# Patient Record
Sex: FEMALE
[De-identification: names, ages, dates, MRNs, and addresses within clinical notes are randomized per-mention and may not be internally consistent; named-entity substitution may affect disease eponyms.]

---

## 2019-08-28 ENCOUNTER — HOSPITAL ENCOUNTER (INPATIENT)
Dept: HOSPITAL 5 - 3A | Age: 79
LOS: 4 days | Discharge: HOME | DRG: 920 | End: 2019-09-01
Attending: INTERNAL MEDICINE | Admitting: PLASTIC SURGERY
Payer: MEDICARE

## 2019-08-28 ENCOUNTER — HOSPITAL ENCOUNTER (OUTPATIENT)
Dept: HOSPITAL 5 - OR | Age: 79
Discharge: HOME | End: 2019-08-28
Attending: PLASTIC SURGERY
Payer: MEDICARE

## 2019-08-28 VITALS — DIASTOLIC BLOOD PRESSURE: 91 MMHG | SYSTOLIC BLOOD PRESSURE: 150 MMHG

## 2019-08-28 DIAGNOSIS — Z88.5: ICD-10-CM

## 2019-08-28 DIAGNOSIS — D62: ICD-10-CM

## 2019-08-28 DIAGNOSIS — Z88.8: ICD-10-CM

## 2019-08-28 DIAGNOSIS — Z98.82: ICD-10-CM
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
## 2019-08-28 DIAGNOSIS — Z98.41: ICD-10-CM

## 2019-08-28 DIAGNOSIS — Z79.899: ICD-10-CM

## 2019-08-28 DIAGNOSIS — N62: ICD-10-CM

## 2019-08-28 DIAGNOSIS — K21.9: ICD-10-CM

## 2019-08-28 DIAGNOSIS — Z82.49: ICD-10-CM

## 2019-08-28 DIAGNOSIS — F43.10: ICD-10-CM
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
## 2019-08-28 DIAGNOSIS — Z88.0: ICD-10-CM

## 2019-08-28 DIAGNOSIS — D50.8: ICD-10-CM

## 2019-08-28 DIAGNOSIS — Z88.6: ICD-10-CM

## 2019-08-28 DIAGNOSIS — L76.32: Primary | ICD-10-CM

## 2019-08-28 DIAGNOSIS — I10: ICD-10-CM

## 2019-08-28 DIAGNOSIS — G43.909: ICD-10-CM

## 2019-08-28 DIAGNOSIS — Z98.890: ICD-10-CM

## 2019-08-28 DIAGNOSIS — D56.9: ICD-10-CM

## 2019-08-28 DIAGNOSIS — Z86.2: ICD-10-CM

## 2019-08-28 DIAGNOSIS — E86.1: ICD-10-CM

## 2019-08-28 DIAGNOSIS — Z90.710: ICD-10-CM

## 2019-08-28 DIAGNOSIS — F41.9: ICD-10-CM
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
## 2019-08-28 DIAGNOSIS — Z98.42: ICD-10-CM

## 2019-08-28 DIAGNOSIS — I95.9: ICD-10-CM

## 2019-08-28 DIAGNOSIS — N64.4: Primary | ICD-10-CM

## 2019-08-28 DIAGNOSIS — J45.909: ICD-10-CM
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
## 2019-08-28 PROCEDURE — 36415 COLL VENOUS BLD VENIPUNCTURE: CPT

## 2019-08-28 PROCEDURE — 86900 BLOOD TYPING SEROLOGIC ABO: CPT

## 2019-08-28 PROCEDURE — 86922 COMPATIBILITY TEST ANTIGLOB: CPT

## 2019-08-28 PROCEDURE — 86901 BLOOD TYPING SEROLOGIC RH(D): CPT

## 2019-08-28 PROCEDURE — 86850 RBC ANTIBODY SCREEN: CPT

## 2019-08-28 PROCEDURE — 82747 ASSAY OF FOLIC ACID RBC: CPT

## 2019-08-28 PROCEDURE — G0379 DIRECT REFER HOSPITAL OBSERV: HCPCS

## 2019-08-28 PROCEDURE — 86870 RBC ANTIBODY IDENTIFICATION: CPT

## 2019-08-28 PROCEDURE — 88302 TISSUE EXAM BY PATHOLOGIST: CPT

## 2019-08-28 PROCEDURE — 82607 VITAMIN B-12: CPT

## 2019-08-28 PROCEDURE — 85007 BL SMEAR W/DIFF WBC COUNT: CPT

## 2019-08-28 PROCEDURE — 19371 PERI-IMPLT CAPSLC BRST COMPL: CPT

## 2019-08-28 PROCEDURE — 80053 COMPREHEN METABOLIC PANEL: CPT

## 2019-08-28 PROCEDURE — 85025 COMPLETE CBC W/AUTO DIFF WBC: CPT

## 2019-08-28 PROCEDURE — 93010 ELECTROCARDIOGRAM REPORT: CPT

## 2019-08-28 PROCEDURE — 93005 ELECTROCARDIOGRAM TRACING: CPT

## 2019-08-28 PROCEDURE — P9016 RBC LEUKOCYTES REDUCED: HCPCS

## 2019-08-28 PROCEDURE — 94760 N-INVAS EAR/PLS OXIMETRY 1: CPT

## 2019-08-28 PROCEDURE — A4217 STERILE WATER/SALINE, 500 ML: HCPCS

## 2019-08-28 PROCEDURE — 83550 IRON BINDING TEST: CPT

## 2019-08-28 PROCEDURE — 85027 COMPLETE CBC AUTOMATED: CPT

## 2019-08-28 PROCEDURE — 82728 ASSAY OF FERRITIN: CPT

## 2019-08-28 RX ADMIN — HYDROMORPHONE HYDROCHLORIDE PRN MG: 1 INJECTION, SOLUTION INTRAMUSCULAR; INTRAVENOUS; SUBCUTANEOUS at 10:46

## 2019-08-28 RX ADMIN — HYDROMORPHONE HYDROCHLORIDE PRN MG: 1 INJECTION, SOLUTION INTRAMUSCULAR; INTRAVENOUS; SUBCUTANEOUS at 11:31

## 2019-08-28 NOTE — OPERATIVE REPORT
Operative Report


Operative Report: 





Plastic Surgery Operative Note





Preoperative diagnosis: Bilateral mastodynia, symptomatic macromastia


Post Operative diagnosis: Same


Procedure: Bilateral explant with capsulectomy


Surgeon:  Dr. Amy Packer


Assistant: None


Anesthesia: General


Estimated blood loss: Minimal


Specimen: En bloc resection of periprosthetic capsule with implant





Indications: This patient is a 79-year-old female who presented with complaint 

of painful implants as well as excessively heavy implants since cosmetic 

augmentation performed several years ago.  Patient states that she had neck and 

back and shoulder pain and also pain in around the breast which she attributes 

to the implants.  She sought consultation for removal of the implants and does 

not want them replaced.  We discussed the benefits as well as the risks of this 

procedure including but not limited to hematoma, seroma, infection, bleeding, 

loss of volume, breast ptosis, disfigurement, scarring, and the need for further

surgery.  Patient understood and accepted these risks and desired to proceed 

with explant and capsulectomy.  Informed consent was obtained.





Procedure: After review of pertinent history and physical exam findings, the 

patient was brought into the operating room and placed supine on the OR table.  

After induction of adequate general endotracheal anesthesia, the entire chest 

was prepped and draped in the usual sterile surgical fashion.  To begin, on the 

right breast, 1% lidocaine with epinephrine was infiltrated into the skin where 

the incisions were marked.  A #15 blade was used to make the incision followed 

by electrocautery to take gaining the dissection down through breast tissue to 

the level of the periprosthetic capsule.  Of note, this was a submammary implant

procedure and pectoralis muscle was noted to be intact posterior to the implant.

 Once this was identified, a periprosthetic dissection was performed, keeping it

intact with the implant inside.  The implant was noted to be intact, without 

gross evidence of rupture.  Once the entire capsule and implant were removed en 

bloc, attention was turned to hemostasis.  This was achieved with electrocautery

after which the implant pocket was thoroughly irrigated with saline.  Noting no 

active bleeding, and 19 [place] Angel drain was placed within the submammary 

pocket and secured with 2-0 nylon suture.  We then began a 3 layer closure using

2-0 Monocryl suture followed by 3-0 Monoderm Quill in 2 layers.  The exact same 

procedure was repeated on the left side.  Of note, a much smaller implant was 

removed from the left side and patient was noted to have more native breast 

tissue on this side as well.  All incisions were then sealed with Dermabond and 

dressed with Telfa and Tegaderm.  The patient was then placed in a bra binder 

and awakened from general anesthesia.  She was then transferred to recovery room

in stable condition.  There were no complications.  All sponge needle and 

instrument counts were correct at the end of the case.

## 2019-08-28 NOTE — ANESTHESIA CONSULTATION
Anesthesia Consult and Med Hx


Date of service: 08/28/19





- Airway


Anesthetic Teeth Evaluation: Dentures (Pt has metal bars to hold dentures and 

has kept them in during prior surgeries)


ROM Head & Neck: Adequate


Mental/Hyoid Distance: Adequate


Mallampati Class: Class II


Intubation Access Assessment: Good





- Pre-Operative Health Status


ASA Pre-Surgery Classification: ASA2


Proposed Anesthetic Plan: General





- Pulmonary


Hx Smoking: Yes (Former)


Hx Asthma: Yes (As a child)


Hx Respiratory Symptoms: Yes (Seasonal allergies)





- Cardiovascular System


Hx Hypertension: Yes


Hx Heart Murmur: Yes





- Central Nervous System


Hx Back Pain: Yes (S/P back surgery)


Hx Psychiatric Problems: Yes





- Gastrointestinal


Hx Gastroesophageal Reflux Disease: Yes





- Hematic


Hx Anemia: Yes (Thalassemia minor)





- Other Systems


Hx Cancer: No

## 2019-08-29 LAB
BASOPHILS # (AUTO): 0 K/MM3 (ref 0–0.1)
BASOPHILS NFR BLD AUTO: 0.1 % (ref 0–1.8)
EOSINOPHIL # BLD AUTO: 0 K/MM3 (ref 0–0.4)
EOSINOPHIL NFR BLD AUTO: 0 % (ref 0–4.3)
HCT VFR BLD CALC: 18.2 % (ref 30.3–42.9)
HGB BLD-MCNC: 5.6 GM/DL (ref 10.1–14.3)
LYMPHOCYTES # BLD AUTO: 1 K/MM3 (ref 1.2–5.4)
LYMPHOCYTES NFR BLD AUTO: 13.4 % (ref 13.4–35)
MCHC RBC AUTO-ENTMCNC: 31 % (ref 30–34)
MCV RBC AUTO: 63 FL (ref 79–97)
MONOCYTES # (AUTO): 0.7 K/MM3 (ref 0–0.8)
MONOCYTES % (AUTO): 9.4 % (ref 0–7.3)
PLATELET # BLD: 236 K/MM3 (ref 140–440)
RBC # BLD AUTO: 2.89 M/MM3 (ref 3.65–5.03)

## 2019-08-29 PROCEDURE — 0H9T00Z DRAINAGE OF RIGHT BREAST WITH DRAINAGE DEVICE, OPEN APPROACH: ICD-10-PCS | Performed by: PLASTIC SURGERY

## 2019-08-29 PROCEDURE — 30233N1 TRANSFUSION OF NONAUTOLOGOUS RED BLOOD CELLS INTO PERIPHERAL VEIN, PERCUTANEOUS APPROACH: ICD-10-PCS | Performed by: INTERNAL MEDICINE

## 2019-08-29 RX ADMIN — METHOCARBAMOL SCH MG: 750 TABLET ORAL at 23:26

## 2019-08-29 RX ADMIN — MORPHINE SULFATE PRN MG: 4 INJECTION, SOLUTION INTRAMUSCULAR; INTRAVENOUS at 21:02

## 2019-08-29 RX ADMIN — HYDROCODONE BITARTRATE AND ACETAMINOPHEN PRN EACH: 10; 325 TABLET ORAL at 11:54

## 2019-08-29 RX ADMIN — DIPHENHYDRAMINE HYDROCHLORIDE PRN MG: 25 CAPSULE ORAL at 11:00

## 2019-08-29 RX ADMIN — METHOCARBAMOL SCH MG: 750 TABLET ORAL at 11:58

## 2019-08-29 RX ADMIN — HYDROCODONE BITARTRATE AND ACETAMINOPHEN PRN EACH: 10; 325 TABLET ORAL at 05:35

## 2019-08-29 RX ADMIN — HYDROMORPHONE HYDROCHLORIDE PRN MG: 1 INJECTION, SOLUTION INTRAMUSCULAR; INTRAVENOUS; SUBCUTANEOUS at 17:10

## 2019-08-29 RX ADMIN — TRAMADOL HYDROCHLORIDE PRN MG: 50 TABLET, COATED ORAL at 02:18

## 2019-08-29 RX ADMIN — METHOCARBAMOL SCH MG: 750 TABLET ORAL at 05:38

## 2019-08-29 RX ADMIN — HYDROCODONE BITARTRATE AND ACETAMINOPHEN PRN EACH: 10; 325 TABLET ORAL at 22:04

## 2019-08-29 RX ADMIN — METOCLOPRAMIDE PRN MG: 10 TABLET ORAL at 02:18

## 2019-08-29 RX ADMIN — DIPHENHYDRAMINE HYDROCHLORIDE PRN MG: 25 CAPSULE ORAL at 05:34

## 2019-08-29 RX ADMIN — HYDROMORPHONE HYDROCHLORIDE PRN MG: 1 INJECTION, SOLUTION INTRAMUSCULAR; INTRAVENOUS; SUBCUTANEOUS at 17:20

## 2019-08-29 RX ADMIN — TRAMADOL HYDROCHLORIDE PRN MG: 50 TABLET, COATED ORAL at 10:20

## 2019-08-29 NOTE — HISTORY AND PHYSICAL REPORT
History of Present Illness


Date of examination: 08/29/19


Date of admission: 


08/29/19 14:21








Medications and Allergies


                                    Allergies











Allergy/AdvReac Type Severity Reaction Status Date / Time


 


ammonium [From [place]ren[place]] Allergy  Respiratory Verified 08/27/19 10:56





   Arrest  


 


codeine Allergy  Shortness Verified 08/27/19 10:56





   of Breath  


 


herbal drugs [From Serenitas] Allergy  Respiratory Verified 08/27/19 10:56





   Arrest  


 


magnesium gluconate Allergy  Respiratory Verified 08/27/19 10:56





[From Serenitas]   Arrest  


 


Penicillins Allergy  Anaphylaxis Verified 08/27/19 10:56


 


potassium [From Serenitas] Allergy  Respiratory Verified 08/27/19 10:56





   Arrest  


 


pregabalin [From Lyrica] Allergy  Disoriented Verified 08/27/19 10:56


 


prochlorperazine Allergy  Shortness Verified 08/27/19 10:56





[From Compazine]   of Breath  


 


sodium [From [place]ren[place]] Allergy  Respiratory Verified 08/27/19 10:56





   Arrest  


 


tramadol Allergy  Shortness Verified 08/29/19 11:36





   of Breath  


 


valerian [From [place]renitas] Allergy  Respiratory Verified 08/27/19 10:56





   Arrest  


 


benzin Allergy  Blisters, Uncoded 08/27/19 10:56





   rash  











                                Home Medications











 Medication  Instructions  Recorded  Confirmed  Last Taken  Type


 


HYDROcodone/ACETAMINOPHEN [Vicodin 1 tab PO Q6HR PRN 08/27/19 08/28/19 08/28/19 

03:40 History





HP  mg TAB]     


 


Lisinopril [Zestril TAB] 10 - 20 mg PO DAILY 08/27/19 08/27/19 08/27/19 08:00 

History


 


Montelukast Sodium 10 mg PO HS PRN 08/27/19 08/28/19 08/27/19 18:00 History


 


Omeprazole 20 mg PO DAILY PRN 08/27/19 08/27/19 08/27/19 08:00 History


 


methOCARBAMOL [Robaxin TAB] 500 mg PO Q6H PRN 08/27/19 08/28/19 08/27/19 20:00 

History











Active Meds: 


Active Medications





Acetaminophen (Tylenol)  650 mg PO Q6H PRN


   PRN Reason: Pain MILD(1-3)/Fever >100.5/HA


   Last Admin: 08/29/19 11:00 Dose:  650 mg


   Documented by: 


Acetaminophen/Hydrocodone Bitart ([place] 10/325)  1 each PO Q6H PRN


   PRN Reason: Pain, Moderate (4-6)


   Last Admin: 08/29/19 11:54 Dose:  1 each


   Documented by: 


Diphenhydramine HCl (Benadryl)  25 mg PO Q8H PRN


   PRN Reason: Itching


   Last Admin: 08/29/19 11:00 Dose:  25 mg


   Documented by: 


Hydromorphone HCl (Dilaudid)  0.5 mg IV Q10MIN PRN


   PRN Reason: Pain , Severe (7-10)


   Stop: 08/30/19 17:06


   Last Admin: 08/29/19 17:20 Dose:  0.5 mg


   Documented by: 


Lactated Ringer's (Lactated Ringers)  1,000 mls @ 125 mls/hr IV AS DIRECT IRVING


   Last Admin: 08/29/19 02:12 Dose:  125 mls/hr


   Documented by: 


Methocarbamol (Robaxin)  750 mg PO Q6H IRVING


   Last Admin: 08/29/19 11:58 Dose:  750 mg


   Documented by: 


Methylprednisolone Sodium Succinate (Solu-Medrol)  40 mg IV Q6H PRN


   PRN Reason: Anaphylaxis


Metoclopramide HCl (Reglan)  10 mg PO Q6H PRN


   PRN Reason: Nausea And Vomiting


   Last Admin: 08/29/19 02:18 Dose:  10 mg


   Documented by: 


Ondansetron HCl (Zofran)  4 mg IV Q8H PRN


   PRN Reason: N/V unrelieved by Reglan


Sodium Chloride (Sodium Chloride Flush Syringe 10 Ml)  10 ml IV PRN PRN


   PRN Reason: LINE FLUSH


   Last Admin: 08/29/19 02:15 Dose:  10 ml


   Documented by: 











Exam





- Constitutional


Vitals: 


                                        











Temp Pulse Resp BP Pulse Ox


 


 97.8 F   88   15   158/47   100 


 


 08/29/19 17:30  08/29/19 17:30  08/29/19 17:30  08/29/19 17:30  08/29/19 17:30














Results





- Labs


CBC & Chem 7: 


                                 08/29/19 06:42





Labs: 


                             Laboratory Last Values











WBC  7.6 K/mm3 (4.5-11.0)   08/29/19  06:42    


 


RBC  2.89 M/mm3 (3.65-5.03)  L  08/29/19  06:42    


 


Hgb  5.6 gm/dl (10.1-14.3)  L*  08/29/19  06:42    


 


Hct  18.2 % (30.3-42.9)  L*  08/29/19  06:42    


 


MCV  63 fl (79-97)  L  08/29/19  06:42    


 


MCH  20 pg (28-32)  L  08/29/19  06:42    


 


MCHC  31 % (30-34)   08/29/19  06:42    


 


RDW  17.0 % (13.2-15.2)  H  08/29/19  06:42    


 


Plt Count  236 K/mm3 (140-440)   08/29/19  06:42    


 


Lymph % (Auto)  13.4 % (13.4-35.0)   08/29/19  06:42    


 


Mono % (Auto)  9.4 % (0.0-7.3)  H  08/29/19  06:42    


 


Eos % (Auto)  0.0 % (0.0-4.3)   08/29/19  06:42    


 


Baso % (Auto)  0.1 % (0.0-1.8)   08/29/19  06:42    


 


Lymph #  1.0 K/mm3 (1.2-5.4)  L  08/29/19  06:42    


 


Mono #  0.7 K/mm3 (0.0-0.8)   08/29/19  06:42    


 


Eos #  0.0 K/mm3 (0.0-0.4)   08/29/19  06:42    


 


Baso #  0.0 K/mm3 (0.0-0.1)   08/29/19  06:42    


 


Seg Neutrophils %  77.1 % (40.0-70.0)  H  08/29/19  06:42    


 


Seg Neutrophils #  5.8 K/mm3 (1.8-7.7)   08/29/19  06:42    


 


Blood Type  AB NEGATIVE   08/29/19  06:48    


 


Antibody Screen  Positive   08/29/19  06:48    


 


Antibody Identification  Anti-D (Actively Aquired)   08/29/19  06:48    


 


Crossmatch  See Detail   08/29/19  06:48

## 2019-08-29 NOTE — OPERATIVE REPORT
Operative Report


Operative Report: 


Plastic Surgery Operative Note





Preoperative Diagnosis: Right breast hematoma s/p right breast explant and caps

ulectomy


Postoperative Diagnosis:  Same


Procedure:  Evacuation of hematoma, washout and closure


Surgeon: Dr. Amy Packer


Assistant:  None


Specimen:  none


EBL: 10ml





Indications:  This patient is a 79 year old female who was admitted last night 

postoperatively for complaint of syncope and increase in her right breast drain 

output several hours after undergoing explant with capsulectomy.  Patient was 

discharged with minimal drain outputs, but reports taking her binder at home 

because she felt like it was too tight.  Shortly thereafter, the drain outputs 

began to increase dramatically and she had a syncopal episode witnessed by her 

.  She was transferred by ambulance to [facility], where she was 

noted to have acute blood loss anemia with a hemoglobin of 7.4.  Patient was 

then transferred to [facility] for further management and 

admitted to Telemetry where a type and cross was ordered.  She was noted to have

antibodies that makes her blood type rare, so the [employer] was 

contacted for PRBC's.  Upon arrival transfusion was initiated and she was taken 

urgently to the OR for evacuation of hematoma and washout.





Procedure:  After review pertinent history and physical exam findings patient 

was brought into the operating room and placed supine on the OR table.  After 

induction of adequate general endotracheal anesthesia the right chest was p

repped and draped in the usual sterile surgical fashion.  To begin, the 

Dermabond of the right breast incision was removed as well as the old drain.  

The incision was reopened using a Metzenbaum scissors to cut the existing 

Monoderm Quill sutures and an access to the submammary pocket.  Once inside, 

350-400 mL of hematoma was evacuated with suction and the pocket, which was 

noted to have scattered small bleeding fat but no discrete large source of 

bleeding, was irrigated using Pulsavac and further examined.  Electrocautery was

used for hemostasis.  Candace was then applied for chemical hemostasis and a new 

19 Fr Angel drain was placed and secured with 2-0 Nylon suture.  The wound was 

then closed in 3 layers using 2-0 Monocryl and 3-0 Monoderm Quill.  All 

incisions were then sealed with Dermabond. Patient was then awakened from 

general anesthesia and transferred to recovery in stable condition.  She 

tolerated the procedure well.  There were no complications.  All sponge, needle 

and instrument counts were correct at the end of the case.

## 2019-08-29 NOTE — PROGRESS NOTE
Subjective


Date of service: 08/29/19


Principal diagnosis: Syncope; Acute blood loss anemia


Interval history: 





Plastic Surgery Progress Note





This patient is a 79 year old female who underwent bilateral breast implant 

removal with capsulectomy on 8/28/19 in the morning.  By evening after discharge

she reports removing her bra binder, shortly after which point her right drain 

output began to increase dramatically.  She then reports "passing out", which 

prompted her  to call an ambulance.  She was transported to [place] [facility] 

where an H&H drawn came back at 7.4 and she had 75cc of bloody drainage in her 

bulb.  I spoke with the ER phsyican and coordinated a direct admit to have her 

transferred to [facility] for further management.  Overnight she reports pain, denies 

sob, cp or dizziness but is complaining of anxiety.  She is used to taking 10mg 

of Vicodin every 6 hours for pain, so she is demanding this dose in-house.  





Physical Exam:


AF; P 100's, /40's overnight.


HEENT:  NCAT; EOMI


CVS:  S1/S2, RRR


Lungs: CTAB


Breast:  Left breast exam incisions clean and dry, unremarkable, drain output 

serosanguinous; right breast incisions clean and dry, drain output bloody, 25cc;

exam suspicious for hematoma.   


Abd: Soft, nt/nd


Extr: Warm, moves all extremities








A/P  POD #1 s/p bilateral explant with capsulectomy, now with active bleeding 

and hematoma of the right breast, s/p syncopal episode, Hgb now 5.4.


For STAT blood transfusion.  She will need blood from the [place] as she has 

rare antibodies from prior transfusions


Patient revealed a history of Thalassemia today.  We will consult Heme/Onc for 

further management recommendations.


Plan for return to the OR for evacuation of hematoma, closure.  


Patient will return to step-down unit.





Objective





- Constitutional


Vitals: 


                               Vital Signs - 12hr











  08/29/19 08/29/19 08/29/19





  02:18 05:35 08:08


 


Temperature   98.8 F


 


Pulse Rate   103 H


 


Respiratory 20 20 18





Rate   


 


Blood Pressure   126/45


 


O2 Sat by Pulse   99





Oximetry   














  08/29/19 08/29/19 08/29/19





  10:48 11:15 11:30


 


Temperature  98.7 F 98.7 F


 


Pulse Rate  105 H 95 H


 


Respiratory  18 18





Rate   


 


Blood Pressure  134/46 130/51


 


O2 Sat by Pulse 100 96 95





Oximetry   














  08/29/19





  11:39


 


Temperature 


 


Pulse Rate 105 H


 


Respiratory 





Rate 


 


Blood Pressure 


 


O2 Sat by Pulse 





Oximetry 














- Labs


CBC & Chem 7: 


                                 08/29/19 06:42





Labs: 


                              Abnormal lab results











  08/29/19 08/29/19 Range/Units





  06:42 06:48 


 


RBC  2.89 L   (3.65-5.03)  M/mm3


 


Hgb  5.6 L*   (10.1-14.3)  gm/dl


 


Hct  18.2 L*   (30.3-42.9)  %


 


MCV  63 L   (79-97)  fl


 


MCH  20 L   (28-32)  pg


 


RDW  17.0 H   (13.2-15.2)  %


 


Mono % (Auto)  9.4 H   (0.0-7.3)  %


 


Lymph #  1.0 L   (1.2-5.4)  K/mm3


 


Seg Neutrophils %  77.1 H   (40.0-70.0)  %


 


Crossmatch   See Detail  














Medications & Allergies





- Medications


Allergies/Adverse Reactions: 


                                    Allergies





ammonium [From Serenitas] Allergy (Verified 08/27/19 10:56)


   Respiratory Arrest


codeine Allergy (Verified 08/27/19 10:56)


   Shortness of Breath


herbal drugs [From Serenitas] Allergy (Verified 08/27/19 10:56)


   Respiratory Arrest


magnesium gluconate [From Serenitas] Allergy (Verified 08/27/19 10:56)


   Respiratory Arrest


Penicillins Allergy (Verified 08/27/19 10:56)


   Anaphylaxis


potassium [From Serenitas] Allergy (Verified 08/27/19 10:56)


   Respiratory Arrest


pregabalin [From Lyrica] Allergy (Verified 08/27/19 10:56)


   Disoriented


prochlorperazine [From Compazine] Allergy (Verified 08/27/19 10:56)


   Shortness of Breath


sodium [From [place]itas] Allergy (Verified 08/27/19 10:56)


   Respiratory Arrest


tramadol Allergy (Verified 08/29/19 11:36)


   Shortness of Breath


valerian [From Serenitas] Allergy (Verified 08/27/19 10:56)


   Respiratory Arrest


benzin Allergy (Uncoded 08/27/19 10:56)


   Blisters, rash


   A skin adhesive 








Home Medications: 


                                Home Medications











 Medication  Instructions  Recorded  Confirmed  Last Taken  Type


 


HYDROcodone/ACETAMINOPHEN [Vicodin 1 tab PO Q6HR PRN 08/27/19 08/28/19 08/28/19 

03:40 History





HP  mg TAB]     


 


Lisinopril [Zestril TAB] 10 - 20 mg PO DAILY 08/27/19 08/27/19 08/27/19 08:00 

History


 


Montelukast Sodium 10 mg PO HS PRN 08/27/19 08/28/19 08/27/19 18:00 History


 


Omeprazole 20 mg PO DAILY PRN 08/27/19 08/27/19 08/27/19 08:00 History


 


methOCARBAMOL [Robaxin TAB] 500 mg PO Q6H PRN 08/27/19 08/28/19 08/27/19 20:00 

History











Active Medications: 














Generic Name Dose Route Start Last Admin





  Trade Name Freq  PRN Reason Stop Dose Admin


 


Acetaminophen  650 mg  08/28/19 22:19  08/29/19 11:00





  Tylenol  PO   650 mg





  Q6H PRN   Administration





  Pain MILD(1-3)/Fever >100.5/HA  


 


Acetaminophen/Hydrocodone Bitart  1 each  08/29/19 05:20  08/29/19 11:54





  [place] 10/325  PO   1 each





  Q6H PRN   Administration





  Pain, Moderate (4-6)  


 


Diphenhydramine HCl  25 mg  08/28/19 22:19  08/29/19 11:00





  Benadryl  PO   25 mg





  Q8H PRN   Administration





  Itching  


 


Lactated Ringer's  1,000 mls @ 125 mls/hr  08/28/19 23:00  08/29/19 02:12





  Lactated Ringers  IV   125 mls/hr





  AS DIRECT IRVING   Administration


 


Methocarbamol  750 mg  08/29/19 06:00  08/29/19 11:58





  Robaxin  PO   750 mg





  Q6H IRVING   Administration


 


Methylprednisolone Sodium Succinate  40 mg  08/29/19 05:23 





  Solu-Medrol  IV  





  Q6H PRN  





  Anaphylaxis  


 


Metoclopramide HCl  10 mg  08/28/19 22:19  08/29/19 02:18





  Reglan  PO   10 mg





  Q6H PRN   Administration





  Nausea And Vomiting  


 


Ondansetron HCl  4 mg  08/28/19 22:19 





  Zofran  IV  





  Q8H PRN  





  N/V unrelieved by Reglan  


 


Sodium Chloride  10 ml  08/28/19 22:19  08/29/19 02:15





  Sodium Chloride Flush Syringe 10 Ml  IV   10 ml





  PRN PRN   Administration





  LINE FLUSH

## 2019-08-30 LAB
ALBUMIN SERPL-MCNC: 3.5 G/DL (ref 3.9–5)
ALT SERPL-CCNC: 10 UNITS/L (ref 7–56)
BUN SERPL-MCNC: 9 MG/DL (ref 7–17)
BUN/CREAT SERPL: 11 %
CALCIUM SERPL-MCNC: 8.6 MG/DL (ref 8.4–10.2)
HCT VFR BLD CALC: 25.8 % (ref 30.3–42.9)
HEMOLYSIS INDEX: 1
HGB BLD-MCNC: 8.6 GM/DL (ref 10.1–14.3)
IRON SERPL-MCNC: 22 UG/DL (ref 37–170)
MCHC RBC AUTO-ENTMCNC: 33 % (ref 30–34)
MCV RBC AUTO: 73 FL (ref 79–97)
PLATELET # BLD: 218 K/MM3 (ref 140–440)
RBC # BLD AUTO: 3.53 M/MM3 (ref 3.65–5.03)
TIBC SERPL-MCNC: 225 MCG/DL (ref 250–450)

## 2019-08-30 RX ADMIN — HYDROCODONE BITARTRATE AND ACETAMINOPHEN PRN EACH: 10; 325 TABLET ORAL at 22:48

## 2019-08-30 RX ADMIN — DIPHENHYDRAMINE HYDROCHLORIDE PRN MG: 25 CAPSULE ORAL at 04:46

## 2019-08-30 RX ADMIN — METHOCARBAMOL SCH MG: 750 TABLET ORAL at 06:30

## 2019-08-30 RX ADMIN — MORPHINE SULFATE PRN MG: 4 INJECTION, SOLUTION INTRAMUSCULAR; INTRAVENOUS at 20:47

## 2019-08-30 RX ADMIN — MORPHINE SULFATE PRN MG: 4 INJECTION, SOLUTION INTRAMUSCULAR; INTRAVENOUS at 03:06

## 2019-08-30 RX ADMIN — HYDROCODONE BITARTRATE AND ACETAMINOPHEN PRN EACH: 10; 325 TABLET ORAL at 16:36

## 2019-08-30 RX ADMIN — DIPHENHYDRAMINE HYDROCHLORIDE PRN MG: 25 CAPSULE ORAL at 22:49

## 2019-08-30 RX ADMIN — HYDROCODONE BITARTRATE AND ACETAMINOPHEN PRN EACH: 10; 325 TABLET ORAL at 09:57

## 2019-08-30 RX ADMIN — MORPHINE SULFATE PRN MG: 4 INJECTION, SOLUTION INTRAMUSCULAR; INTRAVENOUS at 16:36

## 2019-08-30 RX ADMIN — METOCLOPRAMIDE PRN MG: 10 TABLET ORAL at 04:44

## 2019-08-30 RX ADMIN — METOCLOPRAMIDE PRN MG: 10 TABLET ORAL at 22:49

## 2019-08-30 RX ADMIN — HYDROCODONE BITARTRATE AND ACETAMINOPHEN PRN EACH: 10; 325 TABLET ORAL at 04:44

## 2019-08-30 RX ADMIN — METHOCARBAMOL SCH MG: 750 TABLET ORAL at 23:02

## 2019-08-30 RX ADMIN — MORPHINE SULFATE PRN MG: 4 INJECTION, SOLUTION INTRAMUSCULAR; INTRAVENOUS at 00:04

## 2019-08-30 RX ADMIN — METHOCARBAMOL SCH MG: 750 TABLET ORAL at 12:06

## 2019-08-30 RX ADMIN — DIPHENHYDRAMINE HYDROCHLORIDE PRN MG: 25 CAPSULE ORAL at 12:05

## 2019-08-30 RX ADMIN — Medication SCH MG: at 09:57

## 2019-08-30 RX ADMIN — MORPHINE SULFATE PRN MG: 4 INJECTION, SOLUTION INTRAMUSCULAR; INTRAVENOUS at 06:30

## 2019-08-30 RX ADMIN — MULTIVITAMIN SCH ML: LIQUID ORAL at 09:57

## 2019-08-30 RX ADMIN — METHOCARBAMOL SCH MG: 750 TABLET ORAL at 18:16

## 2019-08-30 RX ADMIN — MORPHINE SULFATE PRN MG: 4 INJECTION, SOLUTION INTRAMUSCULAR; INTRAVENOUS at 13:08

## 2019-08-30 RX ADMIN — METOCLOPRAMIDE PRN MG: 10 TABLET ORAL at 12:09

## 2019-08-30 NOTE — CONSULTATION
REFERRING PHYSICIAN:  Dr. Strickland.



REASON FOR CONSULTATION:  Severe anemia, antibodies present.



HISTORY OF PRESENT ILLNESS:  I saw the patient, a 79-year-old female in the

medical floor.  The patient had bilateral breast implants removal with

capsulectomy on 08/28/2019 in the morning.  The patient states her hemoglobin

was 9 before surgery; at one time, hemoglobin post-surgery was 7.  In evening

time when she removed the bra binder, there was a drain which had increased

blood and ambulance was called.  She was taken to [place] and later, she was

transferred to [facility].  At [place], she was informed that because of

her antibodies, [place] is trying to find the right blood.  At [facility], hemoglobin was found to be 5.  I have been asked to evaluate the

patient.  There is a plan by Dr. Packer to take her back to surgery.  At this

time, no headache, no visual disturbances.  No ear discharge.  Still has a drain

and blood in the drain in the chest area post-implant removal and capsulectomy. 

No vomiting, no diarrhea.  There is a mention of thalassemia as per the notes.



PAST MEDICAL HISTORY:  As above.



SOCIAL HISTORY:  Lives with .



ALLERGIES: TO AMMONIA, CODEINE, HERBAL DRUGS, AND FEW OTHERS.



PHYSICAL EXAMINATION:

VITAL SIGNS:  Temperature 98.9, pulse 88, respirations 12, /55.

HEENT:  Pallor present, no icterus.

NECK:  No neck lymph nodes.

HEART:  S1, S2, drain present in the chest.

ABDOMEN:  Soft.

NEUROLOGIC:  Alert, awake, oriented.

EXTREMITIES:  No calf tenderness.



LABORATORY DATA:  White cells 7, hemoglobin 5.6, MCV 63, platelets 236, red cell

count is 2.89.



ASSESSMENT AND PLAN:

1.  Microcytic anemia, likely secondary to bleed.  There is a mention of

thalassemia.  RBC count is low.  At this time, it is predominantly iron

deficiency.

2.  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

then, she has multiple antibodies.  I spoke to the blood bank.  They are trying

to find appropriate match.

3.  For the breast implant removal and capsulectomy-related bleeding, surgical

team is taking her to Operating Room.

4.  We will look into iron supplement, deficiency investigation and follow the

patient.





DD: 08/30/2019 05:26

DT: 08/30/2019 06:35

JOB# [id number]  [id number]

NM/NTS

## 2019-08-30 NOTE — PROGRESS NOTE
Subjective


Date of service: 08/30/19


Principal diagnosis: anemia


Interval history: 





Plastic Surgery Progress





Patient awake and alert, comfortable.  She removed her ACE wrap against my 

orders.  No further inappropriate bleeding. 








AFVSS


Breasts no evidence of hematoma seroma or infection. 


Drains appropriate serosanguinous output


Labs Hgb 8.6 post transfusion 2 units





Plan:  


Ok to d/c from plastics standpoint.


Discussed with patient the need to follow my written instructions explicitly, an

d how her failure to do so resulted in this series of events.


She understands she MUST wear her binder 24/7 and only remove to shower starting

tmw, replace immediately therafter.


Follow up as an outpatient in one week.





Objective





- Constitutional


Vitals: 


                               Vital Signs - 12hr











  08/30/19 08/30/19 08/30/19





  00:04 02:00 03:00


 


Temperature 97.6 F  


 


Pulse Rate   87


 


Respiratory 16 15 





Rate   


 


Respiratory  17 





Rate [Bilateral   





Chest]   


 


O2 Sat by Pulse  98 





Oximetry   














  08/30/19 08/30/19 08/30/19





  03:06 03:57 04:44


 


Temperature  98.8 F 


 


Pulse Rate   


 


Respiratory 19  11 L





Rate   


 


Respiratory   





Rate [Bilateral   





Chest]   


 


O2 Sat by Pulse   





Oximetry   














- Labs


CBC & Chem 7: 


                                 08/30/19 08:21





                                 08/30/19 08:21


Labs: 


                              Abnormal lab results











  08/29/19 08/30/19 08/30/19 Range/Units





  06:48 08:21 08:21 


 


RBC    3.53 L  (3.65-5.03)  M/mm3


 


Hgb    8.6 L D  (10.1-14.3)  gm/dl


 


Hct    25.8 L D  (30.3-42.9)  %


 


MCV    73 L  (79-97)  fl


 


MCH    24 L  (28-32)  pg


 


RDW    27.9 H  (13.2-15.2)  %


 


Iron     ()  ug/dL


 


TIBC     (250-450)  mcg/dL


 


Total Protein   5.9 L   (6.3-8.2)  g/dL


 


Albumin   3.5 L   (3.9-5)  g/dL


 


Crossmatch  See Detail    














  08/30/19 Range/Units





  08:21 


 


RBC   (3.65-5.03)  M/mm3


 


Hgb   (10.1-14.3)  gm/dl


 


Hct   (30.3-42.9)  %


 


MCV   (79-97)  fl


 


MCH   (28-32)  pg


 


RDW   (13.2-15.2)  %


 


Iron  22 L  ()  ug/dL


 


TIBC  225 L  (250-450)  mcg/dL


 


Total Protein   (6.3-8.2)  g/dL


 


Albumin   (3.9-5)  g/dL


 


Crossmatch   














Medications & Allergies





- Medications


Allergies/Adverse Reactions: 


                                    Allergies





ammonium [From Serenitas] Allergy (Verified 08/27/19 10:56)


   Respiratory Arrest


codeine Allergy (Verified 08/27/19 10:56)


   Shortness of Breath


herbal drugs [From [place]] Allergy (Verified 08/27/19 10:56)


   Respiratory Arrest


magnesium gluconate [From [place]] Allergy (Verified 08/27/19 10:56)


   Respiratory Arrest


Penicillins Allergy (Verified 08/27/19 10:56)


   Anaphylaxis


potassium [From [place]] Allergy (Verified 08/27/19 10:56)


   Respiratory Arrest


pregabalin [From Lyrica] Allergy (Verified 08/27/19 10:56)


   Disoriented


prochlorperazine [From Compazine] Allergy (Verified 08/27/19 10:56)


   Shortness of Breath


sodium [From [place]] Allergy (Verified 08/27/19 10:56)


   Respiratory Arrest


tramadol Allergy (Verified 08/29/19 11:36)


   Shortness of Breath


valerian [From [place]] Allergy (Verified 08/27/19 10:56)


   Respiratory Arrest


benzin Allergy (Uncoded 08/27/19 10:56)


   Blisters, rash


   A skin adhesive 








Home Medications: 


                                Home Medications











 Medication  Instructions  Recorded  Confirmed  Last Taken  Type


 


HYDROcodone/ACETAMINOPHEN [Vicodin 1 tab PO Q6HR PRN 08/27/19 08/28/19 08/28/19 

03:40 History





HP  mg TAB]     


 


Lisinopril [Zestril TAB] 10 - 20 mg PO DAILY 08/27/19 08/27/19 08/27/19 08:00 

History


 


Montelukast Sodium 10 mg PO HS PRN 08/27/19 08/28/19 08/27/19 18:00 History


 


Omeprazole 20 mg PO DAILY PRN 08/27/19 08/27/19 08/27/19 08:00 History


 


methOCARBAMOL [Robaxin TAB] 500 mg PO Q6H PRN 08/27/19 08/28/19 08/27/19 20:00 

History











Active Medications: 














Generic Name Dose Route Start Last Admin





  Trade Name Freq  PRN Reason Stop Dose Admin


 


Acetaminophen  650 mg  08/28/19 22:19  08/29/19 11:00





  Tylenol  PO   650 mg





  Q6H PRN   Administration





  Pain MILD(1-3)/Fever >100.5/HA  


 


Acetaminophen/Hydrocodone Bitart  1 each  08/29/19 05:20  08/30/19 09:57





  [place] 10/325  PO   1 each





  Q6H PRN   Administration





  Pain, Moderate (4-6)  


 


Diphenhydramine HCl  25 mg  08/28/19 22:19  08/30/19 04:46





  Benadryl  PO   25 mg





  Q8H PRN   Administration





  Itching  


 


Ferrous Gluconate  324 mg  08/30/19 10:00  08/30/19 09:57





  Fergon  PO   324 mg





  QDAY IRVING   Administration


 


Hydromorphone HCl  0.5 mg  08/29/19 17:07  08/29/19 17:20





  Dilaudid  IV  08/30/19 17:06  0.5 mg





  Q10MIN PRN   Administration





  Pain , Severe (7-10)  


 


Lactated Ringer's  1,000 mls @ 125 mls/hr  08/28/19 23:00  08/29/19 02:12





  Lactated Ringers  IV   125 mls/hr





  AS DIRECT IRVING   Administration


 


Methocarbamol  750 mg  08/29/19 06:00  08/30/19 06:30





  Robaxin  PO   750 mg





  Q6H IRVING   Administration


 


Methylprednisolone Sodium Succinate  40 mg  08/29/19 05:23 





  Solu-Medrol  IV  





  Q6H PRN  





  Anaphylaxis  


 


Metoclopramide HCl  10 mg  08/28/19 22:19  08/30/19 04:44





  Reglan  PO   10 mg





  Q6H PRN   Administration





  Nausea And Vomiting  


 


Morphine Sulfate  4 mg  08/29/19 20:39  08/30/19 06:30





  Morphine  IV   4 mg





  Q3H PRN   Administration





  Pain , Severe (7-10)  


 


Multivitamins  5 ml  08/30/19 10:00  08/30/19 09:57





  Centrum Liq  PO   5 ml





  QDAY IRVING   Administration


 


Ondansetron HCl  4 mg  08/28/19 22:19 





  Zofran  IV  





  Q8H PRN  





  N/V unrelieved by Reglan  


 


Sodium Chloride  10 ml  08/28/19 22:19  08/29/19 02:15





  Sodium Chloride Flush Syringe 10 Ml  IV   10 ml





  PRN PRN   Administration





  LINE FLUSH

## 2019-08-30 NOTE — HEM/ONC PROGRESS NOTE
Assessment and Plan





1.  Microcytic anemia, likely secondary to bleed.  There is a mention of 

thalassemia.  RBC count is low.  At this time, it is predominantly iron 

deficiency.


2.  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

then, she has multiple antibodies.  I spoke to the blood bank.  They are trying 

to find appropriate match.


3.  For the breast implant removal and capsulectomy-related bleeding, surgical 

team is taking her to Operating Room.


4.  We will look into iron supplement, deficiency investigation and follow the 

patient.








iv iron trial


pt says alpha thal trait


pt denies allergy to sodium


IV iron ferrlecit


oral MVI





- Patient Problems


(1) Anemia


Status: Acute   


Qualifiers: 


   Anemia type: iron deficiency   Iron deficiency anemia type: other iron 

deficiency   Qualified Code(s): D50.8 - Other iron deficiency anemias   





Subjective


Date of service: 08/30/19


Principal diagnosis: anemia


Interval history: 





s/p sx





Objective





- Exam


Narrative Exam: 








Pain  - chest - post sx


General appearance  no acute distress


Performance status  limited self care


Eyes - no icterus


ENT  - no bleeding





LNs  cervical  not palpable


Neck  - no LN


Respiratory  Normal


Breath sounds - CTA





CVS  S1 S2 +


Extremities  no edema


General GI  Soft


Rectal  deferred


female  - deferred


Skin  warm 


Musculoskeletal  moving extremitites 


Neurologically  awake - answers questions





- Constitutional


Vitals: 


                                Last Vital Signs











Temp  98.8 F   08/30/19 03:57


 


Pulse  88   08/29/19 18:55


 


Resp  11 L  08/30/19 04:44


 


BP  145/55   08/29/19 18:55


 


Pulse Ox  100   08/29/19 20:38














- Labs


Lab Results: 


                         Laboratory Results - last 24 hr











  08/29/19 08/29/19





  06:42 06:48


 


WBC  7.6 


 


RBC  2.89 L 


 


Hgb  5.6 L* 


 


Hct  18.2 L* 


 


MCV  63 L 


 


MCH  20 L 


 


MCHC  31 


 


RDW  17.0 H 


 


Plt Count  236 


 


Lymph % (Auto)  13.4 


 


Baso % (Auto)  0.1 


 


Lymph #  1.0 L 


 


Blood Type   AB NEGATIVE


 


Antibody Screen   Positive


 


Antibody Identification   Anti-D (Actively Aquired)


 


Crossmatch   See Detail














Medications & Allergies





- Medications


Allergies/Adverse Reactions: 


                                    Allergies





ammonium [From Serenitas] Allergy (Verified 08/27/19 10:56)


   Respiratory Arrest


codeine Allergy (Verified 08/27/19 10:56)


   Shortness of Breath


herbal drugs [From Serenitas] Allergy (Verified 08/27/19 10:56)


   Respiratory Arrest


magnesium gluconate [From Serenitas] Allergy (Verified 08/27/19 10:56)


   Respiratory Arrest


Penicillins Allergy (Verified 08/27/19 10:56)


   Anaphylaxis


potassium [From Serenitas] Allergy (Verified 08/27/19 10:56)


   Respiratory Arrest


pregabalin [From Lyrica] Allergy (Verified 08/27/19 10:56)


   Disoriented


prochlorperazine [From Compazine] Allergy (Verified 08/27/19 10:56)


   Shortness of Breath


sodium [From Serenitas] Allergy (Verified 08/27/19 10:56)


   Respiratory Arrest


tramadol Allergy (Verified 08/29/19 11:36)


   Shortness of Breath


valerian [From Serenitas] Allergy (Verified 08/27/19 10:56)


   Respiratory Arrest


benzin Allergy (Uncoded 08/27/19 10:56)


   Blisters, rash


   A skin adhesive 








Home Medications: 


                                Home Medications











 Medication  Instructions  Recorded  Confirmed  Last Taken  Type


 


HYDROcodone/ACETAMINOPHEN [Vicodin 1 tab PO Q6HR PRN 08/27/19 08/31/19 08/28/19 

03:40 History





HP  mg TAB]     


 


Montelukast Sodium 10 mg PO HS PRN 08/27/19 08/31/19 08/27/19 18:00 History


 


Omeprazole 20 mg PO NOW PRN 08/27/19 08/31/19 08/27/19 08:00 History


 


methOCARBAMOL [Robaxin TAB] 500 mg PO Q6H PRN 08/27/19 08/31/19 08/27/19 20:00 

History


 


Ferrous Gluconate [Fergon 325  mg PO QDAY #30 tablet 09/01/19  Unknown Rx





tab]     


 


Lisinopril [Zestril TAB] 40 mg PO QDAY #30 tablet 09/01/19  Unknown Rx


 


hydrOXYzine PAMOATE [Vistaril] 50 mg PO TID 30 Days  capsule 09/01/19  Unknown 

Rx


 


predniSONE [Deltasone] 20 mg PO QDAY #7 tab 09/01/19  Unknown Rx











Active Medications: 














Generic Name Dose Route Start Last Admin





  Trade Name Freq  PRN Reason Stop Dose Admin


 


Acetaminophen  650 mg  08/28/19 22:19  08/29/19 11:00





  Tylenol  PO   650 mg





  Q6H PRN   Administration





  Pain MILD(1-3)/Fever >100.5/HA  


 


Acetaminophen/Hydrocodone Bitart  1 each  08/29/19 05:20  08/30/19 04:44





  [place] 10/325  PO   1 each





  Q6H PRN   Administration





  Pain, Moderate (4-6)  


 


Diphenhydramine HCl  25 mg  08/28/19 22:19  08/30/19 04:46





  Benadryl  PO   25 mg





  Q8H PRN   Administration





  Itching  


 


Ferrous Gluconate  324 mg  08/30/19 10:00 





  Fergon  PO  





  QDAY IRVING  


 


Hydromorphone HCl  0.5 mg  08/29/19 17:07  08/29/19 17:20





  Dilaudid  IV  08/30/19 17:06  0.5 mg





  Q10MIN PRN   Administration





  Pain , Severe (7-10)  


 


Lactated Ringer's  1,000 mls @ 125 mls/hr  08/28/19 23:00  08/29/19 02:12





  Lactated Ringers  IV   125 mls/hr





  AS DIRECT IRVING   Administration


 


Methocarbamol  750 mg  08/29/19 06:00  08/30/19 06:30





  Robaxin  PO   750 mg





  Q6H IRVING   Administration


 


Methylprednisolone Sodium Succinate  40 mg  08/29/19 05:23 





  Solu-Medrol  IV  





  Q6H PRN  





  Anaphylaxis  


 


Metoclopramide HCl  10 mg  08/28/19 22:19  08/30/19 04:44





  Reglan  PO   10 mg





  Q6H PRN   Administration





  Nausea And Vomiting  


 


Morphine Sulfate  4 mg  08/29/19 20:39  08/30/19 03:06





  Morphine  IV   4 mg





  Q3H PRN   Administration





  Pain , Severe (7-10)  


 


Multivitamins  5 ml  08/30/19 10:00 





  Centrum Liq  PO  





  QDAY IRVING  


 


Ondansetron HCl  4 mg  08/28/19 22:19 





  Zofran  IV  





  Q8H PRN  





  N/V unrelieved by Reglan  


 


Sodium Chloride  10 ml  08/28/19 22:19  08/29/19 02:15





  Sodium Chloride Flush Syringe 10 Ml  IV   10 ml





  PRN PRN   Administration





  LINE FLUSH

## 2019-08-30 NOTE — EVENT NOTE
Date: 08/29/19


See H/p in reports


Acute blood loss anemia


S/p Breast implant removal


Syncope yesterday sec to Hypovolemia and blood loss.


HTN

## 2019-08-30 NOTE — HISTORY AND PHYSICAL REPORT
CHIEF COMPLAINT:

1.  Syncope.

2.  Severe blood loss anemia.



HISTORY OF PRESENT ILLNESS:  A 79-year-old female was admitted last night

perioperatively for syncope and increased drainage from her right breast drain. 

The patient had a bilateral implant removal.  Apparently, the patient took her

binder at home out, because it was too tight.  After taking the binder out, the

drain outputs began to increase dramatically and she had a syncopal episode

witnessed by her .  The patient called the EMS and the ambulance took her

to [facility] where she was noted to have acute blood loss anemia with a

hemoglobin of 7.4.  The patient was transferred to [facility] for further management and was admitted to telemetry where a type and

cross was ordered.  She was noted to have antibodies that made her blood type

rare.  The [facility] was contacted to give 1 unit of PRBCs.  Upon

arrival, transfusion was initiated and she was taken urgently to the OR for

evacuation of hematoma and washout.  The patient was taken to the OR and

hematoma of 300-400 mL from the right breast was evacuated.  Post-evacuation,

the patient was in pain, but was doing relatively well.  The patient is being

admitted to [facility] for further observation.



PAST MEDICAL HISTORY:  Significant for hypertension, on lisinopril 10 mg daily.



PAST SURGICAL HISTORY:  Breast implants many years ago.  Breast implant removal

on 08/28/2019 and hematoma removal from the right breast on 08/29/2019.



SOCIAL HISTORY:  Does not smoke.  No alcohol, no recreational drugs.



FAMILY HISTORY:  Hypertension.



REVIEW OF SYSTEMS:  Significant for severe pain in the surgical site on the

right breast and the left breast, more so in the right breast region. 

Otherwise, review of systems negative.



PHYSICAL EXAMINATION:

GENERAL:  Elderly female, cooperative during examination, in pain.

VITAL SIGNS:  Temperature 97.6, pulse is 100, blood pressure is 155/78.  Repeat

pulse rate was 88.

HEENT:  Unremarkable.  Pupils equal and reactive.

NECK:  Supple, no lymphadenopathy, no thyromegaly.

LUNGS:  Clear to auscultation and percussion.  Good air entry.

BREASTS:  Both the breasts in binders and drains present.

CARDIOVASCULAR:  S1, S2 heard.  No gallop, no murmur, no rub.  Apical impulse in

left fifth intercostal space in midclavicular line.

ABDOMEN:  Soft and benign.  No hepatosplenomegaly, no guarding, no rigidity. 

Hernial orifices are normal.

EXTREMITIES:  Good pedal pulses.  No pedal edema.

CENTRAL NERVOUS SYSTEM:  Alert and oriented x 4, nonfocal exam.



LABORATORY DATA:  Significant for hemoglobin of 5.6 and hematocrit of 18.2,

platelet count of 236,000.



ASSESSMENT AND PLAN:

1.  Acute anemia secondary to blood loss.  The patient to be transfused 1-2

units of packed red blood cells.  The patient has lot of antibodies.  It is

difficult to get the blood [facility] to

provide the blood 1-2 units.

2.  Right breast hematoma evacuated.  Breast surgery consult placed with Dr. Packer.

3.  Hypertension.  We will initiate blood pressure medicines when the blood

pressure goes up.  Presently, the patient's blood pressure is in the normal

range.  The patient had syncope yesterday because of hypotension.

4.  Syncope secondary to blood loss anemia and hypotension.  No syncope workup

because the etiology is very clear secondary to blood loss anemia and

hypovolemia.

5.  Deep venous thrombosis prophylaxis, sequential compression devices only.

6.  Pain management.  Morphine 4 mg q. 3 p.r.n.  The patient already on very low

dose morphine pump.

7.  Also, gastrointestinal prophylaxis.



CRITICAL CARE STATEMENT:  The high probability of a clinically significant,

sudden or life-threatening deterioration of the hematologic, pulmonary, cardiac

and renal systems required my full and direct attention, intervention, and

personal management.  The aggregate critical care time was 35 minutes.  This

time is in addition to time spent performing reported procedures, but includes

the following, data review and interpretation, patient assessment and monitoring

of vital signs, documentation and medication orders and management.



DISCHARGE PLANNING:  Whenever the patient is hemodynamically stable and the

blood counts are stable, the patient can be discharged.





DD: 08/30/2019 06:05

DT: 08/30/2019 06:43

JOB# [id number]  [id number]

CEHSTER/BASILIO

## 2019-08-31 RX ADMIN — METHOCARBAMOL SCH MG: 750 TABLET ORAL at 18:30

## 2019-08-31 RX ADMIN — MORPHINE SULFATE PRN MG: 4 INJECTION, SOLUTION INTRAMUSCULAR; INTRAVENOUS at 08:26

## 2019-08-31 RX ADMIN — METOCLOPRAMIDE PRN MG: 10 TABLET ORAL at 08:36

## 2019-08-31 RX ADMIN — MORPHINE SULFATE PRN MG: 4 INJECTION, SOLUTION INTRAMUSCULAR; INTRAVENOUS at 03:59

## 2019-08-31 RX ADMIN — HYDROCODONE BITARTRATE AND ACETAMINOPHEN PRN EACH: 10; 325 TABLET ORAL at 16:24

## 2019-08-31 RX ADMIN — Medication SCH MG: at 09:39

## 2019-08-31 RX ADMIN — MORPHINE SULFATE PRN MG: 4 INJECTION, SOLUTION INTRAMUSCULAR; INTRAVENOUS at 22:31

## 2019-08-31 RX ADMIN — HYDROCODONE BITARTRATE AND ACETAMINOPHEN PRN EACH: 10; 325 TABLET ORAL at 06:46

## 2019-08-31 RX ADMIN — METHOCARBAMOL SCH MG: 750 TABLET ORAL at 11:38

## 2019-08-31 RX ADMIN — MULTIVITAMIN SCH ML: LIQUID ORAL at 09:39

## 2019-08-31 RX ADMIN — HYDROXYZINE PAMOATE SCH MG: 25 CAPSULE ORAL at 19:49

## 2019-08-31 RX ADMIN — MORPHINE SULFATE PRN MG: 4 INJECTION, SOLUTION INTRAMUSCULAR; INTRAVENOUS at 14:52

## 2019-08-31 RX ADMIN — METHOCARBAMOL SCH MG: 750 TABLET ORAL at 06:00
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
## 2019-08-31 NOTE — PROGRESS NOTE
Assessment and Plan








80 y/o lady who had zainab breast implant. Removed the binders because she felt 

they were too tight. Had hematoma in the right breats resulting in syncope and 

and collapse. Was taken emergently to OR where the hematoma was evacuated. Pt 

had blood transfusion








- Syncope and collapse


from anemia





-Anemia s/p blood transfusion


secondary to hematoma from breast implant following removal of dressing





- S/p zainab breast implant with evacuation of hematoma from the right breast on 

2/29/19





- HTN


Optimize BP control 





- PTSD


On visteril





- DVT PPX with scd only b/c Anemia and hematoma


 


- Disposition: Optimize BP control from the 170s and D/c home











Subjective


Date of service: 08/30/19


Principal diagnosis: anemia


Interval history: 





no new complaint





Objective





- Exam


Narrative Exam: 





Constitutional: Well-nourished well-developed.  In no distress


Head: Normocephalic atraumatic


Eyes: Pupils are equal round and reactive to light


Nose: No enlarged turbinates, no septal deviation.


Mouth: Moist mucous membranes.


Neck: Supple no thyromegaly.  No bruit.  No JVD


Heart: Regular rate and rhythm, S1-S2 normal.  No rubs murmurs or gallop


Lungs: Clear to auscultation bilaterally. no rales or rhonchi


Abdomen: Soft, nontender. Bowel sound are present. 


Extremities: No edema, no cyanosis, no clubbing.


Neuro: Alert oriented Oriented x3. No focal sensory or motor deficit.


Skin: No rashes or hyperpigmented spots


Musculoskeletal system: No joint pain or swelling


Hematological: No petechia or subcutanous hemorrhages.


Immunological: No multiple septic spots on the skin


Lymphatic: No generalized lymphadenopathy


Psychiatry: Euthymic. Calm.














- Constitutional


Vitals: 


                               Vital Signs - 12hr











  08/31/19 08/31/19 08/31/19





  10:51 11:00 11:11


 


Temperature   


 


Pulse Rate 80 77 98 H


 


Pulse Rate [   





From Monitor]   


 


Respiratory 14 15 18





Rate   


 


Respiratory   





Rate [Bilateral   





Chest]   


 


Blood Pressure 167/59 138/59 138/59


 


O2 Sat by Pulse 92 95 87





Oximetry   














  08/31/19 08/31/19 08/31/19





  11:21 11:31 11:41


 


Temperature   


 


Pulse Rate 100 H 97 H 110 H


 


Pulse Rate [   





From Monitor]   


 


Respiratory 13 8 L 17





Rate   


 


Respiratory   





Rate [Bilateral   





Chest]   


 


Blood Pressure 138/59 138/59 138/59


 


O2 Sat by Pulse 100 100 97





Oximetry   














  08/31/19 08/31/19 08/31/19





  11:51 12:00 12:01


 


Temperature  98.3 F 


 


Pulse Rate 95 H  110 H


 


Pulse Rate [   





From Monitor]   


 


Respiratory 13 15 21





Rate   


 


Respiratory   





Rate [Bilateral   





Chest]   


 


Blood Pressure 138/59  147/90


 


O2 Sat by Pulse 100 97 98





Oximetry   














  08/31/19 08/31/19 08/31/19





  12:10 12:21 12:31


 


Temperature   


 


Pulse Rate 102 H 104 H 102 H


 


Pulse Rate [   





From Monitor]   


 


Respiratory 16 17 12





Rate   


 


Respiratory   





Rate [Bilateral   





Chest]   


 


Blood Pressure 147/90 147/90 147/90


 


O2 Sat by Pulse 84  





Oximetry   














  08/31/19 08/31/19 08/31/19





  12:41 12:51 13:00


 


Temperature   


 


Pulse Rate 104 H 104 H 94 H


 


Pulse Rate [   





From Monitor]   


 


Respiratory 12 12 10 L





Rate   


 


Respiratory   





Rate [Bilateral   





Chest]   


 


Blood Pressure 147/90 147/90 155/51


 


O2 Sat by Pulse 84 86 





Oximetry   














  08/31/19 08/31/19 08/31/19





  13:11 13:21 13:31


 


Temperature   


 


Pulse Rate 92 H 81 81


 


Pulse Rate [   





From Monitor]   


 


Respiratory 12 16 11 L





Rate   


 


Respiratory   





Rate [Bilateral   





Chest]   


 


Blood Pressure 155/51 155/51 155/51


 


O2 Sat by Pulse   





Oximetry   














  08/31/19 08/31/19 08/31/19





  13:41 13:51 14:00


 


Temperature   


 


Pulse Rate 88 91 H 


 


Pulse Rate [   





From Monitor]   


 


Respiratory 11 L 12 





Rate   


 


Respiratory   17





Rate [Bilateral   





Chest]   


 


Blood Pressure 155/51 155/51 


 


O2 Sat by Pulse   





Oximetry   














  08/31/19 08/31/19 08/31/19





  14:16 14:20 14:30


 


Temperature   


 


Pulse Rate  94 H 93 H


 


Pulse Rate [   





From Monitor]   


 


Respiratory  13 10 L





Rate   


 


Respiratory   





Rate [Bilateral   





Chest]   


 


Blood Pressure 155/51 155/51 144/60


 


O2 Sat by Pulse 73 L 98 96





Oximetry   














  08/31/19 08/31/19 08/31/19





  14:40 14:51 15:00


 


Temperature   


 


Pulse Rate 94 H 101 H 96 H


 


Pulse Rate [   





From Monitor]   


 


Respiratory 18 14 12





Rate   


 


Respiratory   





Rate [Bilateral   





Chest]   


 


Blood Pressure  144/60 144/49


 


O2 Sat by Pulse 95 96 96





Oximetry   














  08/31/19 08/31/19 08/31/19





  15:11 15:21 15:31


 


Temperature   


 


Pulse Rate 89 93 H 86


 


Pulse Rate [   





From Monitor]   


 


Respiratory 12 14 12





Rate   


 


Respiratory   





Rate [Bilateral   





Chest]   


 


Blood Pressure 144/49 144/49 144/49


 


O2 Sat by Pulse 96 96 98





Oximetry   














  08/31/19 08/31/19 08/31/19





  15:41 15:51 16:00


 


Temperature   98.6 F


 


Pulse Rate 87 89 96 H


 


Pulse Rate [   





From Monitor]   


 


Respiratory 13 9 L 8 L





Rate   


 


Respiratory   





Rate [Bilateral   





Chest]   


 


Blood Pressure 144/49 144/49 160/74


 


O2 Sat by Pulse 97 99 98





Oximetry   














  08/31/19 08/31/19 08/31/19





  16:11 16:21 16:31


 


Temperature   


 


Pulse Rate 100 H 99 H 99 H


 


Pulse Rate [   





From Monitor]   


 


Respiratory 16 18 17





Rate   


 


Respiratory   





Rate [Bilateral   





Chest]   


 


Blood Pressure 160/74 160/74 160/74


 


O2 Sat by Pulse 99 97 100





Oximetry   














  08/31/19 08/31/19 08/31/19





  16:41 16:51 17:01


 


Temperature   


 


Pulse Rate 104 H 107 H 104 H


 


Pulse Rate [   





From Monitor]   


 


Respiratory 15 15 13





Rate   


 


Respiratory   





Rate [Bilateral   





Chest]   


 


Blood Pressure 160/74 160/74 166/45


 


O2 Sat by Pulse 99 98 97





Oximetry   














  08/31/19 08/31/19 08/31/19





  17:11 17:21 17:31


 


Temperature   


 


Pulse Rate 106 H 108 H 106 H


 


Pulse Rate [   





From Monitor]   


 


Respiratory 17 16 13





Rate   


 


Respiratory   





Rate [Bilateral   





Chest]   


 


Blood Pressure 166/45 166/45 166/45


 


O2 Sat by Pulse  86 





Oximetry   














  08/31/19 08/31/19 08/31/19





  17:41 17:51 18:00


 


Temperature   


 


Pulse Rate 102 H 116 H 92 H


 


Pulse Rate [   





From Monitor]   


 


Respiratory 18 18 15





Rate   


 


Respiratory   





Rate [Bilateral   





Chest]   


 


Blood Pressure 166/45 166/45 166/72


 


O2 Sat by Pulse 96 99 97





Oximetry   














  08/31/19 08/31/19 08/31/19





  18:11 18:23 18:31


 


Temperature   


 


Pulse Rate 149 H 101 H 99 H


 


Pulse Rate [   





From Monitor]   


 


Respiratory 18 24 21





Rate   


 


Respiratory   





Rate [Bilateral   





Chest]   


 


Blood Pressure 166/72 166/72 166/72


 


O2 Sat by Pulse 98  





Oximetry   














  08/31/19 08/31/19 08/31/19





  18:41 18:51 19:00


 


Temperature   


 


Pulse Rate 98 H 99 H 94 H


 


Pulse Rate [   





From Monitor]   


 


Respiratory 16 15 17





Rate   


 


Respiratory   





Rate [Bilateral   





Chest]   


 


Blood Pressure 166/72 166/72 176/68


 


O2 Sat by Pulse 98 97 97





Oximetry   














  08/31/19 08/31/19 08/31/19





  19:11 19:16 19:21


 


Temperature   


 


Pulse Rate 101 H 101 H 89


 


Pulse Rate [   





From Monitor]   


 


Respiratory 17  11 L





Rate   


 


Respiratory   





Rate [Bilateral   





Chest]   


 


Blood Pressure 176/68  176/68


 


O2 Sat by Pulse 99  98





Oximetry   














  08/31/19 08/31/19 08/31/19





  19:45 19:50 21:12


 


Temperature  99.3 F 


 


Pulse Rate   


 


Pulse Rate [ 89  





From Monitor]   


 


Respiratory 14  





Rate   


 


Respiratory   





Rate [Bilateral   





Chest]   


 


Blood Pressure   176/64


 


O2 Sat by Pulse 99  





Oximetry   














- Labs


CBC & Chem 7: 


                                 09/01/19 05:43





                                 09/01/19 05:43

## 2019-09-01 VITALS — DIASTOLIC BLOOD PRESSURE: 59 MMHG | SYSTOLIC BLOOD PRESSURE: 127 MMHG

## 2019-09-01 LAB
%HYPO/RBC NFR BLD AUTO: (no result) %
ALBUMIN SERPL-MCNC: 3.9 G/DL (ref 3.9–5)
ALT SERPL-CCNC: 7 UNITS/L (ref 7–56)
ANISOCYTOSIS BLD QL SMEAR: (no result)
BAND NEUTROPHILS # (MANUAL): 0 K/MM3
BUN SERPL-MCNC: 9 MG/DL (ref 7–17)
BUN/CREAT SERPL: 10 %
CALCIUM SERPL-MCNC: 9.4 MG/DL (ref 8.4–10.2)
HCT VFR BLD CALC: 29.3 % (ref 30.3–42.9)
HEMOLYSIS INDEX: 1
HGB BLD-MCNC: 9.6 GM/DL (ref 10.1–14.3)
MCHC RBC AUTO-ENTMCNC: 33 % (ref 30–34)
MCV RBC AUTO: 73 FL (ref 79–97)
MYELOCYTES # (MANUAL): 0 K/MM3
PLATELET # BLD: 263 K/MM3 (ref 140–440)
PROMYELOCYTES # (MANUAL): 0 K/MM3
RBC # BLD AUTO: 4 M/MM3 (ref 3.65–5.03)
TOTAL CELLS COUNTED BLD: 100

## 2019-09-01 RX ADMIN — MULTIVITAMIN SCH ML: LIQUID ORAL at 09:09

## 2019-09-01 RX ADMIN — METHOCARBAMOL SCH MG: 750 TABLET ORAL at 12:17

## 2019-09-01 RX ADMIN — METHOCARBAMOL SCH MG: 750 TABLET ORAL at 01:00

## 2019-09-01 RX ADMIN — HYDROXYZINE PAMOATE SCH MG: 25 CAPSULE ORAL at 08:10

## 2019-09-01 RX ADMIN — Medication SCH MG: at 09:10

## 2019-09-01 RX ADMIN — HYDROXYZINE PAMOATE SCH: 25 CAPSULE ORAL at 14:16

## 2019-09-01 RX ADMIN — METOCLOPRAMIDE PRN MG: 10 TABLET ORAL at 02:44

## 2019-09-01 RX ADMIN — METHOCARBAMOL SCH MG: 750 TABLET ORAL at 06:19

## 2019-09-01 RX ADMIN — MORPHINE SULFATE PRN MG: 4 INJECTION, SOLUTION INTRAMUSCULAR; INTRAVENOUS at 02:29

## 2019-09-01 RX ADMIN — HYDROCODONE BITARTRATE AND ACETAMINOPHEN PRN EACH: 10; 325 TABLET ORAL at 12:17

## 2019-09-01 RX ADMIN — HYDROCODONE BITARTRATE AND ACETAMINOPHEN PRN EACH: 10; 325 TABLET ORAL at 06:08

## 2019-09-01 NOTE — HEM/ONC PROGRESS NOTE
Assessment and Plan





1.  Microcytic anemia, likely secondary to bleed.  There is a mention of 

thalassemia.  RBC count is low.  At this time, it is predominantly iron 

deficiency.


2.  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

then, she has multiple antibodies.  I spoke to the blood bank.  They are trying 

to find appropriate match.


3.  For the breast implant removal and capsulectomy-related bleeding, surgical 

team is taking her to Operating Room.


4.   iron supplement, deficiency investigation and follow the patient.








iv iron trial


pt says alpha thal trait


pt denies allergy to sodium


IV iron ferrlecit


oral MVI





- Patient Problems


(1) Anemia


Status: Acute   


Qualifiers: 


   Anemia type: iron deficiency   Iron deficiency anemia type: other iron 

deficiency   Qualified Code(s): D50.8 - Other iron deficiency anemias   





Subjective


Date of service: 09/01/19


Principal diagnosis: anemia


Interval history: 





feeling better





Objective





- Exam


Narrative Exam: 








Pain  - none


General appearance  no acute distress


Performance status  limited self care


Eyes - no icterus


ENT  - no bleeding





LNs  cervical  not palpable


Neck  - no LN


Respiratory  Normal


Breath sounds - CTA





CVS  S1 S2 +


Extremities  no edema


General GI  Soft


Rectal  deferred


female  - deferred


Skin  warm 


Musculoskeletal  moving extremitites 


Neurologically  awake - answers questions





- Constitutional


Vitals: 


                                Last Vital Signs











Temp  98.4 F   09/01/19 03:30


 


Pulse  98 H  09/01/19 04:00


 


Resp  15   09/01/19 04:00


 


BP  132/59   09/01/19 03:21


 


Pulse Ox  98   09/01/19 04:00














- Labs


Lab Results: 


                         Laboratory Results - last 24 hr











  09/01/19 09/01/19





  05:43 05:43


 


WBC  7.9 


 


RBC  4.00 


 


Hgb  9.6 L 


 


Hct  29.3 L 


 


MCV  73 L 


 


MCH  24 L 


 


MCHC  33 


 


RDW  28.2 H 


 


Plt Count  263 


 


Sodium   139


 


Potassium   4.2


 


Chloride   99.2


 


Carbon Dioxide   28


 


Anion Gap   16


 


BUN   9


 


Creatinine   0.9


 


Estimated GFR   > 60


 


BUN/Creatinine Ratio   10


 


Glucose   106 H


 


Calcium   9.4


 


Total Bilirubin   0.40


 


AST   10


 


ALT   7


 


Alkaline Phosphatase   84


 


Total Protein   6.6


 


Albumin   3.9


 


Albumin/Globulin Ratio   1.4














Medications & Allergies





- Medications


Allergies/Adverse Reactions: 


                                    Allergies





ammonium [From [place]] Allergy (Verified 08/27/19 10:56)


   Respiratory Arrest


codeine Allergy (Verified 08/27/19 10:56)


   Shortness of Breath


herbal drugs [From [place]] Allergy (Verified 08/27/19 10:56)


   Respiratory Arrest


magnesium gluconate [From [place]] Allergy (Verified 08/27/19 10:56)


   Respiratory Arrest


Penicillins Allergy (Verified 08/27/19 10:56)


   Anaphylaxis


potassium [From [place]] Allergy (Verified 08/27/19 10:56)


   Respiratory Arrest


pregabalin [From [place]] Allergy (Verified 08/27/19 10:56)


   Disoriented


prochlorperazine [From Compazine] Allergy (Verified 08/27/19 10:56)


   Shortness of Breath


sodium [From [place]] Allergy (Verified 08/27/19 10:56)


   Respiratory Arrest


tramadol Allergy (Verified 08/29/19 11:36)


   Shortness of Breath


valerian [From [place]] Allergy (Verified 08/27/19 10:56)


   Respiratory Arrest


benzin Allergy (Uncoded 08/27/19 10:56)


   Blisters, rash


   A skin adhesive 








Home Medications: 


                                Home Medications











 Medication  Instructions  Recorded  Confirmed  Last Taken  Type


 


HYDROcodone/ACETAMINOPHEN [[place]odin 1 tab PO Q6HR PRN 08/27/19 08/31/19 08/28/19 

03:40 History





HP  mg TAB]     


 


Montelukast Sodium 10 mg PO HS PRN 08/27/19 08/31/19 08/27/19 18:00 History


 


Omeprazole 20 mg PO NOW PRN 08/27/19 08/31/19 08/27/19 08:00 History


 


methOCARBAMOL [Robaxin TAB] 500 mg PO Q6H PRN 08/27/19 08/31/19 08/27/19 20:00 

History


 


Ferrous Gluconate [Fergon 325  mg PO QDAY #30 tablet 09/01/19  Unknown Rx





tab]     


 


Lisinopril [Zestril TAB] 40 mg PO QDAY #30 tablet 09/01/19  Unknown Rx


 


hydrOXYzine PAMOATE [Vistaril] 50 mg PO TID 30 Days  capsule 09/01/19  Unknown 

Rx


 


predniSONE [Deltasone] 20 mg PO QDAY #7 tab 09/01/19  Unknown Rx











Active Medications: 














Generic Name Dose Route Start Last Admin





  Trade Name Freq  PRN Reason Stop Dose Admin


 


Acetaminophen  650 mg  08/28/19 22:19  08/29/19 11:00





  Tylenol  PO   650 mg





  Q6H PRN   Administration





  Pain MILD(1-3)/Fever >100.5/HA  


 


Acetaminophen/Hydrocodone Bitart  1 each  08/29/19 05:20  09/01/19 06:08





  [place] 10/325  PO   1 each





  Q6H PRN   Administration





  Pain, Moderate (4-6)  


 


Ferrous Gluconate  324 mg  08/30/19 10:00  08/31/19 09:39





  Fergon  PO   324 mg





  QDAY IRVING   Administration


 


Hydroxyzine Pamoate  50 mg  08/31/19 20:00  08/31/19 19:49





  Vistaril  PO   50 mg





  TID IRVING   Administration


 


Lactated Ringer's  1,000 mls @ 125 mls/hr  08/28/19 23:00  08/29/19 02:12





  Lactated Ringers  IV   125 mls/hr





  AS DIRECT IRVING   Administration


 


Lisinopril  40 mg  09/01/19 10:00 





  Zestril  PO  





  QDAY IRVING  


 


Methocarbamol  750 mg  08/29/19 06:00  09/01/19 06:19





  Robaxin  PO   750 mg





  Q6H IRVING   Administration


 


Methylprednisolone Sodium Succinate  40 mg  08/29/19 05:23 





  Solu-Medrol  IV  





  Q6H PRN  





  Anaphylaxis  


 


Metoclopramide HCl  10 mg  08/28/19 22:19  09/01/19 02:44





  Reglan  PO   10 mg





  Q6H PRN   Administration





  Nausea And Vomiting  


 


Montelukast Sodium  10 mg  08/31/19 20:12 





  Singulair  PO  





  HS PRN  





  seasonal allergies  


 


Morphine Sulfate  4 mg  08/29/19 20:39  09/01/19 02:29





  Morphine  IV   4 mg





  Q3H PRN   Administration





  Pain , Severe (7-10)  


 


Multivitamins  5 ml  08/30/19 10:00  08/31/19 09:39





  Centrum Liq  PO   5 ml





  QDAY IRVING   Administration


 


Ondansetron HCl  4 mg  08/28/19 22:19 





  Zofran  IV  





  Q8H PRN  





  N/V unrelieved by Reglan  


 


Pantoprazole Sodium  20 mg  08/31/19 20:26 





  Protonix  PO  





  QDAY PRN  





  ACID REFLUX  


 


Sodium Chloride  10 ml  08/28/19 22:19  08/29/19 02:15





  Sodium Chloride Flush Syringe 10 Ml  IV   10 ml





  PRN PRN   Administration





  LINE FLUSH

## 2019-09-01 NOTE — EVENT NOTE
Date: 09/01/19





Pt had /83, prior to admission pt was on Lisinopril 10mg daily. Restarted 

home dose of Lisinopril and BP was re-check 1 hout after receiving med. BP now 

145/60. Will continue to monitor BP.

## 2019-09-01 NOTE — DISCHARGE SUMMARY
Providers





- Providers


Date of Admission: 


08/29/19 14:21





Attending physician: 


MOON GARCIA MD





                                        





08/29/19 08:10


Consult to Physician [CONS] Routine 


   Comment: 


   Consulting Provider: CINDY MATTHEWS


   Physician Instructions: 


   Reason For Exam: Post op anemia due to acute blood loss





08/30/19 06:07


Consult to Physician [CONS] Routine 


   Comment: 


   Consulting Provider: ANETA WOOD


   Physician Instructions: 


   Reason For Exam: Rt breast hematoma





08/30/19 20:25


psychiatry consult [Consult to Mental Health] [CONS] Routine 


   Reason For Exam: Pt expressed need for anti-anxiety meds s/t PTSD


   Place consult to:: Mental Health


   Notified:: Sophie


   Phone number called:: [phone]


   Was contact made?: Yes


   If yes, spoke with:: Sophie


   Time called:: 20:29











Primary care physician: 


ANETA WOOD MD








Hospitalization


Hospital course: 


78 y/o lady who had zainab breast implant. Removed the binders because she felt 

they were too tight. Had hematoma in the right breats resulting in syncope and 

and collapse. Was taken emergently to OR where the hematoma was evacuated. Pt 

had blood transfusion








- Syncope and collapse


from anemia





-Anemia s/p blood transfusion-2 UNITS


secondary to hematoma from breast implant following removal of dressing





- S/p zainab breast implant with evacuation of hematoma from the right on 2/29/19





- HTN


Optimize BP control 





- PTSD


On visteril





- DVT PPX with scd only b/c Anemai and hematoma


 


- Disposition: Optimize BP control from the 170s and D/c home








Plastic Surgery Progress





Patient awake and alert, comfortable.  She removed her ACE wrap against my 

orders.  No further inappropriate bleeding. 








AFVSS


Breasts no evidence of hematoma seroma or infection. 


Drains appropriate serosanguinous output


Labs Hgb 8.6 post transfusion 2 units





Plan:  


Ok to d/c from plastics standpoint.


Discussed with patient the need to follow my written instructions explicitly, 

and how her failure to do so resulted in this series of events.


She understands she MUST wear her binder 24/7 and only remove to shower starting

 tmw, replace immediately therafter.


Follow up as an outpatient in one week.








Impression:


primary complaint is anxiety/panic 





history of post traumatic stress disorder








Recommendation:


benzodiazepines not recommended to be used concurrently with opiates





She declines antidepressants as they are first line for anxiety disorders and 

PTSD





Vistaril 50mg tid ( she reports 25mg was ineffective the last time she took it)


Benadryl discontinued. Vistaril can be used for itching also but has anxiolytic 

properties also.





dispo: She was provided with outpatient psychiatry referrals





will staff with Dr. JESSI Yin











Disposition: [id number]/TX-06 HOME UNDER HOME HL[facility]


Time spent for discharge: 35 mins





Core Measure Documentation





- Palliative Care


Palliative Care/ Comfort Measures: Not Applicable





Exam





- Constitutional


Vitals: 


                                        











Temp Pulse Resp BP Pulse Ox


 


 98.4 F   98 H  15   132/59   98 


 


 09/01/19 03:30  09/01/19 04:00  09/01/19 04:00  09/01/19 03:21  09/01/19 04:00














Plan


Activity: advance as tolerated, fall precautions


Diet: low fat


Wound: per your surgeon's advice (She understands she MUST wear her binder 24/7 

and only remove to shower starting tmw, replace immediately therafter.)


Special Instructions: record daily BP diary


Durable Medical Equipment Needed Upon Discharge: other (BP CUFF)


Health Concerns: 


Need to recheck H/H and also renal function in 1 week


Follow up with: 


ANETA WOOD MD [Primary Care Provider] - 7 Days


[facility] [Outside] - 7 Days


Prescriptions: 


predniSONE [Deltasone] 20 mg PO QDAY #7 tab


Ferrous Gluconate [Fergon 325 MG tab] 324 mg PO QDAY #30 tablet


hydrOXYzine PAMOATE [Vistaril] 50 mg PO TID 30 Days  capsule


Lisinopril [Zestril TAB] 40 mg PO QDAY #30 tablet